# Patient Record
Sex: MALE | Race: WHITE | NOT HISPANIC OR LATINO | URBAN - NONMETROPOLITAN AREA
[De-identification: names, ages, dates, MRNs, and addresses within clinical notes are randomized per-mention and may not be internally consistent; named-entity substitution may affect disease eponyms.]

---

## 2021-03-30 ENCOUNTER — IMPORTED ENCOUNTER (OUTPATIENT)
Dept: URBAN - NONMETROPOLITAN AREA CLINIC 1 | Facility: CLINIC | Age: 66
End: 2021-03-30

## 2021-03-30 PROBLEM — H52.03: Noted: 2021-03-30

## 2021-03-30 PROBLEM — H52.223: Noted: 2021-03-30

## 2021-03-30 PROBLEM — H52.4: Noted: 2021-03-30

## 2021-03-30 PROBLEM — H16.223: Noted: 2021-03-30

## 2021-03-30 PROBLEM — H25.13: Noted: 2021-03-30

## 2021-03-30 PROCEDURE — 92004 COMPRE OPH EXAM NEW PT 1/>: CPT

## 2021-03-30 PROCEDURE — 92015 DETERMINE REFRACTIVE STATE: CPT

## 2021-03-30 NOTE — PATIENT DISCUSSION
Cataracts OU-  discussed findings w/ patient -  UV Protection reccomended-  slow progression no treatment indicated at this time-  continue to monitor yearly or prnMixed Astigmatism OU w/ Presbyopia -  dsicussed findings w/ patient -  new spectacle Rx issued today-  continue to monitor yearly or prn; 's Notes: MR 3/30/2021DFE 3/30/2021

## 2022-04-09 ASSESSMENT — TONOMETRY
OD_IOP_MMHG: 16
OS_IOP_MMHG: 15

## 2022-04-09 ASSESSMENT — VISUAL ACUITY
OD_CC: 20/40
OD_PH: 20/25-
OS_CC: 20/30-1
OU_SC: 20/20-2

## 2022-04-09 ASSESSMENT — KERATOMETRY
OD_AXISANGLE_DEGREES: 102
OD_K2POWER_DIOPTERS: 46.75
OS_K1POWER_DIOPTERS: 45.25
OS_K2POWER_DIOPTERS: 46.00
OD_K1POWER_DIOPTERS: 45.50
OS_AXISANGLE_DEGREES: 077